# Patient Record
Sex: FEMALE | Race: WHITE | URBAN - METROPOLITAN AREA
[De-identification: names, ages, dates, MRNs, and addresses within clinical notes are randomized per-mention and may not be internally consistent; named-entity substitution may affect disease eponyms.]

---

## 2024-05-16 ASSESSMENT — PATIENT HEALTH QUESTIONNAIRE - PHQ9
1. LITTLE INTEREST OR PLEASURE IN DOING THINGS: NOT AT ALL
SUM OF ALL RESPONSES TO PHQ QUESTIONS 1-9: 0
2. FEELING DOWN, DEPRESSED OR HOPELESS: NOT AT ALL
SUM OF ALL RESPONSES TO PHQ QUESTIONS 1-9: 0
SUM OF ALL RESPONSES TO PHQ9 QUESTIONS 1 & 2: 0
2. FEELING DOWN, DEPRESSED OR HOPELESS: NOT AT ALL
SUM OF ALL RESPONSES TO PHQ9 QUESTIONS 1 & 2: 0
1. LITTLE INTEREST OR PLEASURE IN DOING THINGS: NOT AT ALL

## 2024-05-17 ENCOUNTER — OFFICE VISIT (OUTPATIENT)
Dept: FAMILY MEDICINE CLINIC | Age: 34
End: 2024-05-17
Payer: COMMERCIAL

## 2024-05-17 VITALS
RESPIRATION RATE: 18 BRPM | BODY MASS INDEX: 25.2 KG/M2 | OXYGEN SATURATION: 98 % | DIASTOLIC BLOOD PRESSURE: 68 MMHG | HEART RATE: 85 BPM | HEIGHT: 71 IN | TEMPERATURE: 97.9 F | SYSTOLIC BLOOD PRESSURE: 140 MMHG | WEIGHT: 180 LBS

## 2024-05-17 DIAGNOSIS — Z87.42 HISTORY OF FEMALE INFERTILITY: Primary | ICD-10-CM

## 2024-05-17 DIAGNOSIS — N92.0 MENORRHAGIA WITH REGULAR CYCLE: ICD-10-CM

## 2024-05-17 DIAGNOSIS — N94.6 DYSMENORRHEA: ICD-10-CM

## 2024-05-17 PROCEDURE — 99204 OFFICE O/P NEW MOD 45 MIN: CPT | Performed by: FAMILY MEDICINE

## 2024-05-17 RX ORDER — MULTIVIT WITH MINERALS/LUTEIN
500 TABLET ORAL DAILY
COMMUNITY

## 2024-05-17 RX ORDER — CHLORAL HYDRATE 500 MG
10000 CAPSULE ORAL DAILY
COMMUNITY

## 2024-05-17 RX ORDER — MONTELUKAST SODIUM 10 MG/1
10 TABLET ORAL DAILY
COMMUNITY
Start: 2023-12-05 | End: 2024-05-17 | Stop reason: ALTCHOICE

## 2024-05-17 RX ORDER — LANOLIN ALCOHOL/MO/W.PET/CERES
400 CREAM (GRAM) TOPICAL DAILY
COMMUNITY
Start: 2024-02-01

## 2024-05-17 SDOH — ECONOMIC STABILITY: INCOME INSECURITY: HOW HARD IS IT FOR YOU TO PAY FOR THE VERY BASICS LIKE FOOD, HOUSING, MEDICAL CARE, AND HEATING?: NOT HARD AT ALL

## 2024-05-17 SDOH — ECONOMIC STABILITY: FOOD INSECURITY: WITHIN THE PAST 12 MONTHS, YOU WORRIED THAT YOUR FOOD WOULD RUN OUT BEFORE YOU GOT MONEY TO BUY MORE.: NEVER TRUE

## 2024-05-17 SDOH — ECONOMIC STABILITY: FOOD INSECURITY: WITHIN THE PAST 12 MONTHS, THE FOOD YOU BOUGHT JUST DIDN'T LAST AND YOU DIDN'T HAVE MONEY TO GET MORE.: NEVER TRUE

## 2024-05-17 SDOH — ECONOMIC STABILITY: HOUSING INSECURITY
IN THE LAST 12 MONTHS, WAS THERE A TIME WHEN YOU DID NOT HAVE A STEADY PLACE TO SLEEP OR SLEPT IN A SHELTER (INCLUDING NOW)?: NO

## 2024-05-17 ASSESSMENT — PATIENT HEALTH QUESTIONNAIRE - PHQ9
SUM OF ALL RESPONSES TO PHQ QUESTIONS 1-9: 0
1. LITTLE INTEREST OR PLEASURE IN DOING THINGS: NOT AT ALL
SUM OF ALL RESPONSES TO PHQ QUESTIONS 1-9: 0
2. FEELING DOWN, DEPRESSED OR HOPELESS: NOT AT ALL
SUM OF ALL RESPONSES TO PHQ QUESTIONS 1-9: 0
SUM OF ALL RESPONSES TO PHQ QUESTIONS 1-9: 0
SUM OF ALL RESPONSES TO PHQ9 QUESTIONS 1 & 2: 0

## 2024-05-17 NOTE — PROGRESS NOTES
Appearance: Normal appearance. She is well-developed. She is not diaphoretic.   HENT:      Head: Normocephalic and atraumatic.      Right Ear: Tympanic membrane, ear canal and external ear normal.      Left Ear: Tympanic membrane, ear canal and external ear normal.      Nose: Nose normal. No congestion or rhinorrhea.      Mouth/Throat:      Mouth: Mucous membranes are moist.      Pharynx: Oropharynx is clear. No oropharyngeal exudate.   Eyes:      General: No scleral icterus.        Right eye: No discharge.         Left eye: No discharge.      Conjunctiva/sclera: Conjunctivae normal.      Pupils: Pupils are equal, round, and reactive to light.   Neck:      Thyroid: No thyromegaly.      Vascular: No JVD.      Trachea: No tracheal deviation.      Comments: Trace diffuse  thyromegaly  Cardiovascular:      Rate and Rhythm: Normal rate and regular rhythm.      Pulses: Normal pulses.      Heart sounds: Normal heart sounds. No murmur heard.     No friction rub.   Pulmonary:      Effort: Pulmonary effort is normal. No respiratory distress.      Breath sounds: Normal breath sounds. No stridor. No wheezing, rhonchi or rales.   Abdominal:      General: Bowel sounds are normal. There is no distension.      Palpations: Abdomen is soft. There is no mass.      Tenderness: There is no abdominal tenderness. There is no guarding or rebound.      Hernia: No hernia is present.   Musculoskeletal:         General: No swelling, tenderness or deformity. Normal range of motion.      Cervical back: Normal range of motion and neck supple.      Right lower leg: No edema.      Left lower leg: No edema.   Lymphadenopathy:      Cervical: No cervical adenopathy.   Skin:     General: Skin is warm and dry.      Findings: No rash.   Neurological:      General: No focal deficit present.      Mental Status: She is alert and oriented to person, place, and time. Mental status is at baseline.      Deep Tendon Reflexes: Reflexes are normal and symmetric.

## 2024-05-17 NOTE — PATIENT INSTRUCTIONS
progesterone and estradiol levels peak plus 3,5,7,9,11,     Low Dose Naltrexone: look up Dr Sarkis Reid's YouTube presentation. (Dr Reid, [place]).   https://www.youtube.com/watch?[email or web address]    Naltrexone 50 mg tab, crushed, suspended in 50 ml of distilled water.  This makes an oral liquid formulation at 1mg/1ml.  It is taken daily, at bedtime. (Mix with a flavored liquid if taste is unpleasant)  Start at 1 ml nightly.  Increase dose to 2, then 3, then 4, then 5 ml gradually as tolerated. (Usually a week at each dose, but can be longer if there are side effects).  Because there are so few preservatives, it should be stored in the fridge. It can last 3 months if frigerated.

## 2024-05-31 ENCOUNTER — PATIENT MESSAGE (OUTPATIENT)
Dept: FAMILY MEDICINE CLINIC | Age: 34
End: 2024-05-31

## 2024-05-31 NOTE — TELEPHONE ENCOUNTER
Karrie Hunter MA 5/31/2024 11:28 AM EDT      ----- Message -----  From: Chiqui Busby  Sent: 5/31/2024 11:08 AM EDT  To: [facility]  Subject: Test results     Hi Dr neville, I just wanted to follow up with you after I have completed the progesterone and estradiol levels. My progesterone significantly dropped so I am assuming I am not pregnant. Anyway, just wanted to let you know I had all the labs drawn and I was wondering what your thoughts were and what the next step is. Thanks!  Chiqui busby

## 2024-06-07 ENCOUNTER — TELEPHONE (OUTPATIENT)
Dept: FAMILY MEDICINE CLINIC | Age: 34
End: 2024-06-07

## 2024-06-07 NOTE — TELEPHONE ENCOUNTER
Called the pharmacy to see what medication they would like the patient to go on.  Insurance will approve Novarel 5,000  Is this ok to send in to pharmacy?

## 2024-06-07 NOTE — TELEPHONE ENCOUNTER
This is not the usual dose we use for luteal support.  But it would be OK to try using this first.    Usual dose is 8923-3720 IU of HCG peak plus 3,5,7,9    If her plan only covers 5000 IU HCG, she can use 1000 IU on peak plus 3, then 2000 IU on peak plus 5 and 7.    If Chiqui mixed the 5000 IU of HCG in 5 ml of sterile water, then it will be 1000 I per cc of the medicine.  (So 1 cc peak plus 3, 2 cc peak 5 and 7)    Can we call this to pharmacy?

## 2024-06-07 NOTE — TELEPHONE ENCOUNTER
Pharmacy called in wants to change prescription on the HCG for insurance purposes     Contact [phone]  Ext [phone]   Option 2   Pharmacist

## 2024-06-21 ENCOUNTER — PATIENT MESSAGE (OUTPATIENT)
Dept: FAMILY MEDICINE CLINIC | Age: 34
End: 2024-06-21

## 2024-06-24 NOTE — TELEPHONE ENCOUNTER
From: Chiqui Busby  To: Dr. Arsh Sy  Sent: 6/21/2024 4:43 PM EDT  Subject: Estradiol and progesterone results     Hi Dr Sy,   I wanted to let you know my estradiol and progesterone lab results that I had drawn today.  Peak day was June 14th which was cycle day 14 for me. Today is peak plus 7 so I did the third dose of hcg and had labs drawn, I will attach a screen shot of the results and I can also send the phone number for the [facility] lab. I also wanted to let you know that I go out of town next Tuesday afternoon so if we try the stimulation meds this month I will need to get them filled by then or fill them in [place]. Thanks!

## 2024-07-02 ENCOUNTER — PATIENT MESSAGE (OUTPATIENT)
Dept: FAMILY MEDICINE CLINIC | Age: 34
End: 2024-07-02

## 2024-07-03 RX ORDER — LETROZOLE 2.5 MG/1
TABLET, FILM COATED ORAL
Qty: 8 TABLET | Refills: 5 | Status: SHIPPED | OUTPATIENT
Start: 2024-07-03

## 2024-07-03 NOTE — TELEPHONE ENCOUNTER
From: Chiqui Darnell  To: Dr. Arsh Sy  Sent: 7/2/2024 3:01 PM EDT  Subject: Fertility plan    Hi dr Sy , sorry it has taken me so long to respond! We have been traveling:)   So following up with the hcg shots, yes my levels were better! Symptoms wise though, I have had diarrhea since taking the shots, is that normal? Nothing else has changed, so that is the only thing I can contribute it to. Also, I am on day 5 of my period and I haven’t had the nausea and headaches or rectal/pelvic pain like in the past and my flow is lighter, but I have had awful abdominal pain. 800 mg of ibuprofen and 1000 mg Tylenol around the clock didn’t even touch the pain for the first 3 days.   I guess I would still like to try the hcg shots again this month to see if I feel anything different? Maybe the pain will be better ? And I had decided not skip letrozole for his month since I have been traveling and will be traveling for the next 5 weeks, but I would like to try it next month if that sounds ok to you. Sorry that was really long, I will respond back faster this time. Thanks!  -Chiqui darnell

## 2024-07-16 ENCOUNTER — PATIENT MESSAGE (OUTPATIENT)
Dept: FAMILY MEDICINE CLINIC | Age: 34
End: 2024-07-16

## 2024-07-16 NOTE — TELEPHONE ENCOUNTER
From: Chiqui Darnell  To: Dr. Arsh Sy  Sent: 7/16/2024 1:56 PM EDT  Subject: Order for estradiol and progesterone     Hi Dr Sy, I am in [place] for a month with family and am due to get my labs drawn this weekend but I made it here without the order. Is there anyway you could send me another order and I can print it off here? Let me know, thanks!  -Chiqui darnell

## 2024-07-24 RX ORDER — PROGESTERONE 200 MG/1
CAPSULE ORAL
Qty: 30 CAPSULE | Refills: 1 | Status: SHIPPED | OUTPATIENT
Start: 2024-07-24

## 2024-08-16 ENCOUNTER — PATIENT MESSAGE (OUTPATIENT)
Dept: FAMILY MEDICINE CLINIC | Age: 34
End: 2024-08-16

## 2024-09-07 ENCOUNTER — PATIENT MESSAGE (OUTPATIENT)
Dept: FAMILY MEDICINE CLINIC | Age: 34
End: 2024-09-07

## 2024-09-09 RX ORDER — ESTRADIOL 1 MG/1
1 TABLET ORAL DAILY
Qty: 30 TABLET | Refills: 1 | Status: SHIPPED | OUTPATIENT
Start: 2024-09-09

## 2024-09-23 ENCOUNTER — PATIENT MESSAGE (OUTPATIENT)
Dept: FAMILY MEDICINE CLINIC | Age: 34
End: 2024-09-23

## 2024-10-04 ENCOUNTER — PATIENT MESSAGE (OUTPATIENT)
Dept: FAMILY MEDICINE CLINIC | Age: 34
End: 2024-10-04

## 2024-10-04 RX ORDER — CHORIONIC GONADOTROPIN 10000 UNIT
KIT INTRAMUSCULAR
Qty: 1 EACH | Refills: 3 | Status: CANCELLED | OUTPATIENT
Start: 2024-10-04

## 2024-10-07 RX ORDER — LETROZOLE 2.5 MG/1
TABLET, FILM COATED ORAL
Qty: 10 TABLET | Refills: 2 | Status: SHIPPED | OUTPATIENT
Start: 2024-10-07

## 2024-10-07 RX ORDER — CHORIONIC GONADOTROPIN 10000 UNIT
KIT INTRAMUSCULAR
Qty: 1 EACH | Refills: 2 | Status: SHIPPED | OUTPATIENT
Start: 2024-10-07

## 2024-10-08 ENCOUNTER — TELEPHONE (OUTPATIENT)
Dept: FAMILY MEDICINE CLINIC | Age: 34
End: 2024-10-08

## 2024-10-08 NOTE — TELEPHONE ENCOUNTER
[facility] calling in regards to the HCG   Insurance prefers novarel   Phone number for pharm [phone] opt [phone] then 2

## 2024-10-15 ENCOUNTER — PATIENT MESSAGE (OUTPATIENT)
Dept: FAMILY MEDICINE CLINIC | Age: 34
End: 2024-10-15

## 2024-11-15 ENCOUNTER — PATIENT MESSAGE (OUTPATIENT)
Dept: FAMILY MEDICINE CLINIC | Age: 34
End: 2024-11-15

## 2024-12-18 ENCOUNTER — PATIENT MESSAGE (OUTPATIENT)
Dept: FAMILY MEDICINE CLINIC | Age: 34
End: 2024-12-18

## 2024-12-30 ENCOUNTER — PATIENT MESSAGE (OUTPATIENT)
Dept: FAMILY MEDICINE CLINIC | Age: 34
End: 2024-12-30

## 2024-12-30 RX ORDER — CHORIONIC GONADOTROPIN 10000 UNIT
KIT INTRAMUSCULAR
Qty: 1 EACH | Refills: 2 | Status: SHIPPED | OUTPATIENT
Start: 2024-12-30

## 2024-12-30 RX ORDER — CLOMIPHENE CITRATE 50 MG/1
50 TABLET ORAL DAILY
Qty: 9 TABLET | Refills: 1 | Status: SHIPPED | OUTPATIENT
Start: 2024-12-30

## 2024-12-30 RX ORDER — LETROZOLE 2.5 MG/1
TABLET, FILM COATED ORAL
Qty: 10 TABLET | Refills: 2 | Status: SHIPPED | OUTPATIENT
Start: 2024-12-30 | End: 2024-12-30 | Stop reason: ALTCHOICE

## 2024-12-30 RX ORDER — PROGESTERONE 200 MG/1
CAPSULE ORAL
Qty: 30 CAPSULE | Refills: 1 | Status: SHIPPED | OUTPATIENT
Start: 2024-12-30

## 2024-12-30 RX ORDER — ESTRADIOL 1 MG/1
1 TABLET ORAL DAILY
Qty: 30 TABLET | Refills: 1 | Status: SHIPPED | OUTPATIENT
Start: 2024-12-30

## 2025-01-16 ENCOUNTER — PATIENT MESSAGE (OUTPATIENT)
Dept: FAMILY MEDICINE CLINIC | Age: 35
End: 2025-01-16

## 2025-02-03 RX ORDER — PROGESTERONE 200 MG/1
CAPSULE ORAL
Qty: 30 CAPSULE | Refills: 1 | Status: SHIPPED | OUTPATIENT
Start: 2025-02-03

## 2025-03-07 ENCOUNTER — PATIENT MESSAGE (OUTPATIENT)
Dept: FAMILY MEDICINE CLINIC | Age: 35
End: 2025-03-07

## 2025-03-07 RX ORDER — ESTRADIOL 1 MG/1
1 TABLET ORAL DAILY
Qty: 30 TABLET | Refills: 1 | Status: SHIPPED | OUTPATIENT
Start: 2025-03-07

## 2025-03-27 ENCOUNTER — PATIENT MESSAGE (OUTPATIENT)
Dept: FAMILY MEDICINE CLINIC | Age: 35
End: 2025-03-27

## 2025-03-27 DIAGNOSIS — O09.01 PREGNANCY WITH HISTORY OF INFERTILITY IN FIRST TRIMESTER: Primary | ICD-10-CM

## 2025-03-27 RX ORDER — PROGESTERONE 200 MG/1
CAPSULE ORAL
Qty: 60 CAPSULE | Refills: 3 | Status: SHIPPED | OUTPATIENT
Start: 2025-03-27

## 2025-03-27 NOTE — TELEPHONE ENCOUNTER
Will need to fax orders to a lab in [place].  Chiqui should message back with the lab info.  Thanks.

## 2025-03-27 NOTE — TELEPHONE ENCOUNTER
Pt called in with Fax number,  Labs were printed and faxed to [facility]

## 2025-03-28 ENCOUNTER — TELEPHONE (OUTPATIENT)
Dept: FAMILY MEDICINE CLINIC | Age: 35
End: 2025-03-28

## 2025-03-28 NOTE — TELEPHONE ENCOUNTER
Please notify Chiqui Busby that her HCG is 571 and confirms pregnancy.  Her initial progesterone level is 16.2.  this is a bit low, but not terrible.  Have her continue supplemental progesterone at 400mg /d dose and plan to retest progesterone in 2 wks.    I would recommend she continue DHEA 25 mg for now.    Thanks.

## 2025-04-11 ENCOUNTER — TELEPHONE (OUTPATIENT)
Dept: FAMILY MEDICINE CLINIC | Age: 35
End: 2025-04-11

## 2025-04-11 NOTE — TELEPHONE ENCOUNTER
Pt called in had her lab drawn yesterday she said the lab faxed her HCG results over today     Please advise if we have it

## 2025-06-20 DIAGNOSIS — Z87.42 HISTORY OF FEMALE INFERTILITY: ICD-10-CM

## 2025-06-20 DIAGNOSIS — O09.01 PREGNANCY WITH HISTORY OF INFERTILITY IN FIRST TRIMESTER: Primary | ICD-10-CM

## 2025-07-02 ENCOUNTER — PATIENT MESSAGE (OUTPATIENT)
Dept: FAMILY MEDICINE CLINIC | Age: 35
End: 2025-07-02

## 2025-07-02 DIAGNOSIS — O09.01 PREGNANCY IN FIRST TRIMESTER WITH HISTORY OF INFERTILITY, ANTEPARTUM: Primary | ICD-10-CM
